# Patient Record
Sex: MALE | Race: WHITE | URBAN - METROPOLITAN AREA
[De-identification: names, ages, dates, MRNs, and addresses within clinical notes are randomized per-mention and may not be internally consistent; named-entity substitution may affect disease eponyms.]

---

## 2017-05-26 ENCOUNTER — OFFICE VISIT (OUTPATIENT)
Dept: FAMILY MEDICINE | Facility: CLINIC | Age: 57
End: 2017-05-26
Payer: COMMERCIAL

## 2017-05-26 VITALS
BODY MASS INDEX: 20.59 KG/M2 | HEART RATE: 62 BPM | WEIGHT: 139 LBS | TEMPERATURE: 97.7 F | SYSTOLIC BLOOD PRESSURE: 102 MMHG | DIASTOLIC BLOOD PRESSURE: 56 MMHG | HEIGHT: 69 IN

## 2017-05-26 DIAGNOSIS — Z12.11 SPECIAL SCREENING FOR MALIGNANT NEOPLASMS, COLON: ICD-10-CM

## 2017-05-26 DIAGNOSIS — Z12.5 SCREENING FOR PROSTATE CANCER: ICD-10-CM

## 2017-05-26 DIAGNOSIS — M54.42 CHRONIC BILATERAL LOW BACK PAIN WITH LEFT-SIDED SCIATICA: ICD-10-CM

## 2017-05-26 DIAGNOSIS — F41.1 GENERALIZED ANXIETY DISORDER: ICD-10-CM

## 2017-05-26 DIAGNOSIS — H04.123 DRY EYES: ICD-10-CM

## 2017-05-26 DIAGNOSIS — G89.29 CHRONIC BILATERAL LOW BACK PAIN WITH LEFT-SIDED SCIATICA: ICD-10-CM

## 2017-05-26 DIAGNOSIS — M72.0 DUPUYTREN'S CONTRACTURE OF BOTH HANDS: ICD-10-CM

## 2017-05-26 DIAGNOSIS — L27.2 DERMATITIS DUE TO FOOD TAKEN INTERNALLY: ICD-10-CM

## 2017-05-26 DIAGNOSIS — Z11.59 NEED FOR HEPATITIS C SCREENING TEST: ICD-10-CM

## 2017-05-26 DIAGNOSIS — M26.609 TMJ (TEMPOROMANDIBULAR JOINT SYNDROME): ICD-10-CM

## 2017-05-26 DIAGNOSIS — T78.1XXS ORAL ALLERGY SYNDROME, SEQUELA: ICD-10-CM

## 2017-05-26 DIAGNOSIS — R39.9 LOWER URINARY TRACT SYMPTOMS (LUTS): Primary | ICD-10-CM

## 2017-05-26 LAB
ALBUMIN SERPL-MCNC: 4.3 G/DL (ref 3.4–5)
ALP SERPL-CCNC: 42 U/L (ref 40–150)
ALT SERPL W P-5'-P-CCNC: 23 U/L (ref 0–70)
ANION GAP SERPL CALCULATED.3IONS-SCNC: 9 MMOL/L (ref 3–14)
AST SERPL W P-5'-P-CCNC: 22 U/L (ref 0–45)
BILIRUB SERPL-MCNC: 0.8 MG/DL (ref 0.2–1.3)
BUN SERPL-MCNC: 22 MG/DL (ref 7–30)
CALCIUM SERPL-MCNC: 9.2 MG/DL (ref 8.5–10.1)
CHLORIDE SERPL-SCNC: 102 MMOL/L (ref 94–109)
CO2 SERPL-SCNC: 25 MMOL/L (ref 20–32)
CREAT SERPL-MCNC: 0.93 MG/DL (ref 0.66–1.25)
CRP SERPL-MCNC: <2.9 MG/L (ref 0–8)
ERYTHROCYTE [DISTWIDTH] IN BLOOD BY AUTOMATED COUNT: 11.8 % (ref 10–15)
GFR SERPL CREATININE-BSD FRML MDRD: 85 ML/MIN/1.7M2
GLUCOSE SERPL-MCNC: 102 MG/DL (ref 70–99)
HCT VFR BLD AUTO: 39.8 % (ref 40–53)
HGB BLD-MCNC: 13.7 G/DL (ref 13.3–17.7)
MCH RBC QN AUTO: 31.2 PG (ref 26.5–33)
MCHC RBC AUTO-ENTMCNC: 34.4 G/DL (ref 31.5–36.5)
MCV RBC AUTO: 91 FL (ref 78–100)
PLATELET # BLD AUTO: 251 10E9/L (ref 150–450)
POTASSIUM SERPL-SCNC: 3.8 MMOL/L (ref 3.4–5.3)
PROT SERPL-MCNC: 7.6 G/DL (ref 6.8–8.8)
PSA SERPL-ACNC: 1.11 UG/L (ref 0–4)
RBC # BLD AUTO: 4.39 10E12/L (ref 4.4–5.9)
SODIUM SERPL-SCNC: 136 MMOL/L (ref 133–144)
TSH SERPL DL<=0.05 MIU/L-ACNC: 2.81 MU/L (ref 0.4–4)
WBC # BLD AUTO: 7 10E9/L (ref 4–11)

## 2017-05-26 PROCEDURE — 36415 COLL VENOUS BLD VENIPUNCTURE: CPT | Performed by: FAMILY MEDICINE

## 2017-05-26 PROCEDURE — 86140 C-REACTIVE PROTEIN: CPT | Performed by: FAMILY MEDICINE

## 2017-05-26 PROCEDURE — G0103 PSA SCREENING: HCPCS | Performed by: FAMILY MEDICINE

## 2017-05-26 PROCEDURE — 86235 NUCLEAR ANTIGEN ANTIBODY: CPT | Performed by: FAMILY MEDICINE

## 2017-05-26 PROCEDURE — 99205 OFFICE O/P NEW HI 60 MIN: CPT | Performed by: FAMILY MEDICINE

## 2017-05-26 PROCEDURE — 85027 COMPLETE CBC AUTOMATED: CPT | Performed by: FAMILY MEDICINE

## 2017-05-26 PROCEDURE — 84443 ASSAY THYROID STIM HORMONE: CPT | Performed by: FAMILY MEDICINE

## 2017-05-26 PROCEDURE — 80053 COMPREHEN METABOLIC PANEL: CPT | Performed by: FAMILY MEDICINE

## 2017-05-26 PROCEDURE — 86803 HEPATITIS C AB TEST: CPT | Performed by: FAMILY MEDICINE

## 2017-05-26 PROCEDURE — 86003 ALLG SPEC IGE CRUDE XTRC EA: CPT | Performed by: FAMILY MEDICINE

## 2017-05-26 RX ORDER — TAMSULOSIN HYDROCHLORIDE 0.4 MG/1
0.4 CAPSULE ORAL DAILY
Qty: 90 CAPSULE | Refills: 3 | Status: SHIPPED | OUTPATIENT
Start: 2017-05-26 | End: 2017-07-13

## 2017-05-26 RX ORDER — TAMSULOSIN HYDROCHLORIDE 0.4 MG/1
0.4 CAPSULE ORAL DAILY
Qty: 90 CAPSULE | Refills: 1 | Status: SHIPPED | OUTPATIENT
Start: 2017-05-26

## 2017-05-26 RX ORDER — LORAZEPAM 1 MG/1
0.5-1 TABLET ORAL EVERY 8 HOURS PRN
Qty: 4 TABLET | Refills: 0 | Status: SHIPPED | OUTPATIENT
Start: 2017-05-26

## 2017-05-26 ASSESSMENT — ANXIETY QUESTIONNAIRES
3. WORRYING TOO MUCH ABOUT DIFFERENT THINGS: NEARLY EVERY DAY
GAD7 TOTAL SCORE: 15
6. BECOMING EASILY ANNOYED OR IRRITABLE: SEVERAL DAYS
7. FEELING AFRAID AS IF SOMETHING AWFUL MIGHT HAPPEN: NEARLY EVERY DAY
1. FEELING NERVOUS, ANXIOUS, OR ON EDGE: NEARLY EVERY DAY
IF YOU CHECKED OFF ANY PROBLEMS ON THIS QUESTIONNAIRE, HOW DIFFICULT HAVE THESE PROBLEMS MADE IT FOR YOU TO DO YOUR WORK, TAKE CARE OF THINGS AT HOME, OR GET ALONG WITH OTHER PEOPLE: VERY DIFFICULT
5. BEING SO RESTLESS THAT IT IS HARD TO SIT STILL: SEVERAL DAYS
2. NOT BEING ABLE TO STOP OR CONTROL WORRYING: NEARLY EVERY DAY

## 2017-05-26 ASSESSMENT — PATIENT HEALTH QUESTIONNAIRE - PHQ9: 5. POOR APPETITE OR OVEREATING: SEVERAL DAYS

## 2017-05-26 NOTE — PATIENT INSTRUCTIONS
"  Labs today.    The physical therapy department will be contacting you to arrange therapy. They are very.     Ok to use lorazepam (Ativan) prior to blood draws or appointments.     Make appointment with the eye doctor at [facility]. They are very nice.     Flomax trial.     If question about allergies, we will have you see Dr LOPEZ, our allergist.    We can fix your hand contractures when/if you want.    Stress Management:  Carry a list of options to deal with stress all of the time.  Exercise, especially things that are fun and engaging (games, sports, group activities, etc.)  Engage in hobbies.  Consider something new that you've always wanted to do.  Call friends when stressed.  Have a list of people that you can call.   Puzzles, games or other activities that you can pull out for distration.  Progressive relaxation tapes or CD's (Tak[employer]ak).  Well rounded diet, avoiding simple carbohydrates ans sticking with proteins like nuts.  Try to keep a consistent sleep-wake cycle.     Talk with family and friends about stress.  Allowing people to care for you is sometimes the greatest gift you can give them.      In order to best meet your particular needs for a variety of situations, we offer phone visits and e-visits in addition to the traditional office visits.    The e-visit is often convenient, allowing you to contact your provider when it best suits your needs.  If you'd like to schedule an e-visit, simply log into your [employer] account and select \"E-visit\".      Phone visits are good if we need to ask and answer questions of one another or in preparation for an office visit.  If we do follow-up with an office visit, you will not be charged for a phone visit.  To arrange a phone visit, simply respond electronically or contact our schedulers.     If you'd like to schedule an office visit you can do this by calling our main number or schedule electronically through [employer].     Finally, we offer immediate, web-based " services with on-call providers 24/7 through [facility] which can be selected on the [place] home page.

## 2017-05-26 NOTE — NURSING NOTE
"Chief Complaint   Patient presents with     Urinary Problem       Initial /56  Pulse 62  Temp 97.7  F (36.5  C) (Tympanic)  Ht 5' 8.82\" (1.748 m)  Wt 139 lb (63 kg)  BMI 20.63 kg/m2 Estimated body mass index is 20.63 kg/(m^2) as calculated from the following:    Height as of this encounter: 5' 8.82\" (1.748 m).    Weight as of this encounter: 139 lb (63 kg).  Medication Reconciliation: complete     Dasha Farah CMA      "

## 2017-05-26 NOTE — PROGRESS NOTES
SUBJECTIVE:                                                    Sam Piper is a 56 year old male who presents to clinic today for the following health issues:      Pt presents today in clinic with a list of problems to go over with you.     Anxiety - a lot about medical issues.      PHQ-9 SCORE 5/26/2017   Total Score 6     BRINA-7 SCORE 5/26/2017   Total Score 15       Please see flow-sheet for specific details.     LOWER URINARY TRACT SYMPTOMS - weak stream, hesitant, difficulty in public places.  Never saw urology. Less of a problem at home but still some issues. Used saw palmetto but sensitivity issues. Flomax given but never took.     Eyes - weak, blurry, dry eyes. Had episode of anxiety with eye doctor. Fainted and threw up. Left without being seen. Uses natural tears.  Help some.  Dry mouth a lot.     Cold Feet - sweat and cold a lot.  Some pain.  Wears long johns to work. Calluses on feet. Has checked blood sugars and all normal.     Allergies - lifelong.  Will have reactions to foods and hazelnuts.  Carrots causes itchy throat but cooked carrots are ok.     Aches - hips bother him.  Wears an SI belt.  Went to chiropractor in past.  Told that limb length discrepancy. Has had a lot dip swelling and thinks that cherry.     TMJ - wears mouthguard and helps. Chronic.     Hand contractures... - getting worse. Bothersome.     H//O intracranial bleed. 2002 [place]. Angiogram normal.     Problem list and histories reviewed & adjusted, as indicated.  Additional history: as documented        Reviewed and updated as needed this visit by clinical staff  Tobacco  Allergies  Meds  Problems  Med Hx  Surg Hx  Fam Hx  Soc Hx        Reviewed and updated as needed this visit by Provider  Tobacco  Meds  Problems  Soc Hx        1. Lower urinary tract symptoms (LUTS)    2. Generalized anxiety disorder    3. Screening for prostate cancer    4. Dermatitis due to food taken internally    5. Oral allergy syndrome, sequela   "  6. Chronic bilateral low back pain with left-sided sciatica    7. TMJ (temporomandibular joint syndrome)    8. Dupuytren's contracture of both hands    9. Dry eyes    10. Need for hepatitis C screening test    11. Special screening for malignant neoplasms, colon        PMH: Updated and/or reviewed in chart.    PSH: Updated and/or reviewed in chart.    Family History: Updated and/or reviewed in chart.     ROS:  Normal bowels. Constitutional, neuro, EMT, endocrine, pulmonary, cardiac, gastrointestinal, genitourinary, musculoskeletal, integument and psychiatric systems are otherwise negative.    OBJECTIVE:                                                    /56  Pulse 62  Temp 97.7  F (36.5  C) (Tympanic)  Ht 5' 8.82\" (1.748 m)  Wt 139 lb (63 kg)  BMI 20.63 kg/m2  GENERAL: Pleasant and interactive.  Alert and oriented x 3.  No acute distress.  PSYCH: Alert. Cooperative.  Mildly agitated.  Mood is anxious and affect is consistant.  Denies suicidal or homicidal ideation.  HEENT: Normocephalic, atraumatic. PEERRLA, EOMI.  Scleras, lids and conjunctivae normal but the tear film is reduced. Pinnas, canals and TM's clear.  Nose and oropharynx moist and clear.   NECK: supple and free of adenopathy or masses, the thyroid is normal without enlargement or nodules.  CHEST:  clear, no wheezing or rales. Normal symmetric air entry throughout both lung fields. No chest wall deformities or tenderness.  HEART:  S1 and S2 normal, no murmurs, clicks, gallops or rubs. Regular rate and rhythm.  RECTUM: normal without masses. Normal prostate.  SKIN:  Only benign skin findings. No unusual rashes or suspicious skin lesions noted. Nails appear normal.       Results for orders placed or performed in visit on 03/23/12   TSH   Result Value Ref Range    TSH 2.97 0.4 - 5.0 mU/L   Lipid panel reflex to direct LDL   Result Value Ref Range    Cholesterol 176 0 - 200 mg/dL    Triglycerides 68 0 - 150 mg/dL    HDL Cholesterol 54 40 - 110 " mg/dL    LDL Cholesterol Calculated 108 0 - 129 mg/dL    VLDL-Cholesterol 14 0 - 30 mg/dL    Cholesterol/HDL Ratio 3.2 0.0 - 5.0   Vitamin D Deficiency   Result Value Ref Range    25 OH Vit D2 <5 ug/L    25 OH Vit D3 31 ug/L    25 OH Vit D total  30 - 75 ug/L     <36  Season, race, dietary intake, and treatment affect the concentration of   25-hydroxy-Vitamin D. Values may decrease during winter months and increase   during summer months. Values less than 30 ug/L may indicate Vitamin D   deficiency.      ASSESSMENT/PLAN:                                                        ICD-10-CM    1. Lower urinary tract symptoms (LUTS) R39.9 Prostate spec antigen screen     tamsulosin (FLOMAX) 0.4 MG capsule     tamsulosin (FLOMAX) 0.4 MG capsule   2. Generalized anxiety disorder F41.1 TSH     CRP inflammation     CBC with platelets     LORazepam (ATIVAN) 1 MG tablet   3. Screening for prostate cancer Z12.5 Prostate spec antigen screen     tamsulosin (FLOMAX) 0.4 MG capsule   4. Dermatitis due to food taken internally L27.2 Allergy adult food panel   5. Oral allergy syndrome, sequela T78.1XXS CBC with platelets     Allergy adult food panel   6. Chronic bilateral low back pain with left-sided sciatica M54.42 [facility] PT, HAND, AND CHIROPRACTIC REFERRAL    G89.29    7. TMJ (temporomandibular joint syndrome) M26.609    8. Dupuytren's contracture of both hands M72.0 CRP inflammation   9. Dry eyes H04.123 Comprehensive metabolic panel     TSH     CRP inflammation     SSA Ro REMI Antibody IgG     SSB La REMI Antibody IgG     OPHTHALMOLOGY ADULT REFERRAL   10. Need for hepatitis C screening test Z11.59 Hepatitis C Screen Reflex to HCV RNA Quant and Genotype   11. Special screening for malignant neoplasms, colon Z12.11 Fecal colorectal cancer screen FIT       Care plan updated in chart for chronic problems.    Patient Instructions     Labs today.    The physical therapy department will be contacting you to arrange therapy. They are very.  "    Ok to use lorazepam (Ativan) prior to blood draws or appointments.     Make appointment with the eye doctor at [facility]. They are very nice.     Flomax trial.     If question about allergies, we will have you see Dr LOPEZ, our allergist.    We can fix your hand contractures when/if you want.    Stress Management:  Carry a list of options to deal with stress all of the time.  Exercise, especially things that are fun and engaging (games, sports, group activities, etc.)  Engage in hobbies.  Consider something new that you've always wanted to do.  Call friends when stressed.  Have a list of people that you can call.   Puzzles, games or other activities that you can pull out for distration.  Progressive relaxation tapes or CD's ([employer]).  Well rounded diet, avoiding simple carbohydrates ans sticking with proteins like nuts.  Try to keep a consistent sleep-wake cycle.     Talk with family and friends about stress.  Allowing people to care for you is sometimes the greatest gift you can give them.      In order to best meet your particular needs for a variety of situations, we offer phone visits and e-visits in addition to the traditional office visits.    The e-visit is often convenient, allowing you to contact your provider when it best suits your needs.  If you'd like to schedule an e-visit, simply log into your [employer] account and select \"E-visit\".      Phone visits are good if we need to ask and answer questions of one another or in preparation for an office visit.  If we do follow-up with an office visit, you will not be charged for a phone visit.  To arrange a phone visit, simply respond electronically or contact our schedulers.     If you'd like to schedule an office visit you can do this by calling our main number or schedule electronically through [employer].     Finally, we offer immediate, web-based services with on-call providers 24/7 through [facility] which can be selected on the [employer] home page.         " "  No orders of the defined types were placed in this encounter.     BMI:   Estimated body mass index is 20.63 kg/(m^2) as calculated from the following:    Height as of this encounter: 5' 8.82\" (1.748 m).    Weight as of this encounter: 139 lb (63 kg).     30 minutes of the 55 minute appointment was spent with counseling and education and/or coordinating care with regards to above problem(s). See patients instructions for futher details.       See Patient Instructions    Talon Hutchison MD        "

## 2017-05-26 NOTE — MR AVS SNAPSHOT
After Visit Summary   5/26/2017    Sam Piper    MRN: [id number]           Patient Information     Date Of Birth          1960        Visit Information        Provider Department      5/26/2017 10:30 AM Talon Hutchison MD [facility]        Today's Diagnoses     Lower urinary tract symptoms (LUTS)    -  1    Generalized anxiety disorder        Screening for prostate cancer        Dermatitis due to food taken internally        Oral allergy syndrome, sequela        Chronic bilateral low back pain with left-sided sciatica        TMJ (temporomandibular joint syndrome)        Dupuytren's contracture of both hands        Dry eyes        Need for hepatitis C screening test        Special screening for malignant neoplasms, colon          Care Instructions      Labs today.    The physical therapy department will be contacting you to arrange therapy. They are very.     Ok to use lorazepam (Ativan) prior to blood draws or appointments.     Make appointment with the eye doctor at [facility]. They are very nice.     Flomax trial.     If question about allergies, we will have you see Dr LOPEZ, our allergist.    We can fix your hand contractures when/if you want.    Stress Management:  Carry a list of options to deal with stress all of the time.  Exercise, especially things that are fun and engaging (games, sports, group activities, etc.)  Engage in hobbies.  Consider something new that you've always wanted to do.  Call friends when stressed.  Have a list of people that you can call.   Puzzles, games or other activities that you can pull out for distration.  Progressive relaxation tapes or CD's (Cloudkick).  Well rounded diet, avoiding simple carbohydrates ans sticking with proteins like nuts.  Try to keep a consistent sleep-wake cycle.     Talk with family and friends about stress.  Allowing people to care for you is sometimes the greatest gift you can give them.      In order to best meet  "your particular needs for a variety of situations, we offer phone visits and e-visits in addition to the traditional office visits.    The e-visit is often convenient, allowing you to contact your provider when it best suits your needs.  If you'd like to schedule an e-visit, simply log into your [employer] account and select \"E-visit\".      Phone visits are good if we need to ask and answer questions of one another or in preparation for an office visit.  If we do follow-up with an office visit, you will not be charged for a phone visit.  To arrange a phone visit, simply respond electronically or contact our schedulers.     If you'd like to schedule an office visit you can do this by calling our main number or schedule electronically through [employer].     Finally, we offer immediate, web-based services with on-call providers 24/7 through [employer] which can be selected on the [employer] home page.                Follow-ups after your visit        Additional Services     [facility] PT, HAND, AND CHIROPRACTIC REFERRAL       **This order will print in the [facility] Scheduling Office**    Physical Therapy, Hand Therapy and Chiropractic Care are available through:    *[facility]  *[facility]  *[facility]    Call one number to schedule at any of the above locations: [phone].    Your provider has referred you to: Physical Therapy at [facility] or [facility]    Indication/Reason for Referral: Low Back Pain  Onset of Illness: chronic with some SI pain and gluteal issues.   Therapy Orders: Evaluate and Treat  Special Programs: None  Special Request: None    Israel Wood      Additional Comments for the Therapist or Chiropractor:     Please be aware that coverage of these services is subject to the terms and limitations of your health insurance plan.  Call member services at your health plan with any benefit or coverage questions.      Please bring the following to your appointment:    *Your " personal calendar for scheduling future appointments  *Comfortable clothing            OPHTHALMOLOGY ADULT REFERRAL       Your provider has referred you to: [facility]: [facility] [place] [phone]   [email or web address]    Please be aware that coverage of these services is subject to the terms and limitations of your health insurance plan.  Call member services at your health plan with any benefit or coverage questions.      Please bring the following with you to your appointment:    (1) Any X-Rays, CTs or MRIs which have been performed.  Contact the facility where they were done to arrange for  prior to your scheduled appointment.    (2) List of current medications  (3) This referral request   (4) Any documents/labs given to you for this referral                  Future tests that were ordered for you today     Open Future Orders        Priority Expected Expires Ordered    Fecal colorectal cancer screen FIT Routine 6/16/2017 8/18/2017 5/26/2017            Who to contact     Normal or non-critical lab and imaging results will be communicated to you by [employer], letter or phone within 4 business days after the clinic has received the results. If you do not hear from us within 7 days, please contact the clinic through [employer] or phone. If you have a critical or abnormal lab result, we will notify you by phone as soon as possible.  Submit refill requests through [employer] or call your pharmacy and they will forward the refill request to us. Please allow 3 business days for your refill to be completed.          If you need to speak with a  for additional information , please call: [phone]           Additional Information About Your Visit        [employer] Information     [employer] gives you secure access to your electronic health record. If you see a primary care provider, you can also send messages to your care team and make appointments. If you have questions, please call your  "primary care clinic.  If you do not have a primary care provider, please call [phone] and they will assist you.        Care EveryWhere ID     This is your Care EveryWhere ID. This could be used by other organizations to access your [facility] medical records  [id number]        Your Vitals Were     Pulse Temperature Height BMI (Body Mass Index)          62 97.7  F (36.5  C) (Tympanic) 5' 8.82\" (1.748 m) 20.63 kg/m2         Blood Pressure from Last 3 Encounters:   05/26/17 102/56   03/23/12 127/80    Weight from Last 3 Encounters:   05/26/17 139 lb (63 kg)   03/23/12 140 lb (63.5 kg)              We Performed the Following     Allergy adult food panel     CBC with platelets     Comprehensive metabolic panel     CRP inflammation     Hepatitis C Screen Reflex to HCV RNA Quant and Genotype     [facility] PT, HAND, AND CHIROPRACTIC REFERRAL     OPHTHALMOLOGY ADULT REFERRAL     Prostate spec antigen screen     SSA Ro REMI Antibody IgG     SSB La REMI Antibody IgG     TSH          Today's Medication Changes          These changes are accurate as of: 5/26/17 11:49 AM.  If you have any questions, ask your nurse or doctor.               Start taking these medicines.        Dose/Directions    LORazepam 1 MG tablet   Commonly known as:  ATIVAN   Used for:  Generalized anxiety disorder   Started by:  Talon Hutchison MD        Dose:  0.5-1 mg   Take 0.5-1 tablets (0.5-1 mg) by mouth every 8 hours as needed for anxiety Take 30 minutes prior to departure.  Do not operate a vehicle after taking this medication   Quantity:  4 tablet   Refills:  0       * tamsulosin 0.4 MG capsule   Commonly known as:  FLOMAX   Used for:  Lower urinary tract symptoms (LUTS)   Started by:  Talon Hutchison MD        Dose:  0.4 mg   Take 1 capsule (0.4 mg) by mouth daily   Quantity:  90 capsule   Refills:  3       * tamsulosin 0.4 MG capsule   Commonly known as:  FLOMAX   Used for:  Lower urinary tract symptoms (LUTS), Screening for prostate cancer "   Started by:  Talon Hutchison MD        Dose:  0.4 mg   Take 1 capsule (0.4 mg) by mouth daily   Quantity:  90 capsule   Refills:  1       * Notice:  This list has 2 medication(s) that are the same as other medications prescribed for you. Read the directions carefully, and ask your doctor or other care provider to review them with you.         Where to get your medicines      These medications were sent to [facility] - [address]  [address] [facility] [address] [place] [address]     Phone:  [phone]     tamsulosin 0.4 MG capsule    tamsulosin 0.4 MG capsule         Some of these will need a paper prescription and others can be bought over the counter.  Ask your nurse if you have questions.     Bring a paper prescription for each of these medications     LORazepam 1 MG tablet                Primary Care Provider Office Phone # Fax #    Tal Mcmahan [phone]       [facility] [address] [address] [address]        Thank you!     Thank you for choosing [facility]  for your care. Our goal is always to provide you with excellent care. Hearing back from our patients is one way we can continue to improve our services. Please take a few minutes to complete the written survey that you may receive in the mail after your visit with us. Thank you!             Your Updated Medication List - Protect others around you: Learn how to safely use, store and throw away your medicines at www.disposemymeds.org.          This list is accurate as of: 5/26/17 11:49 AM.  Always use your most recent med list.                   Brand Name Dispense Instructions for use    LORazepam 1 MG tablet    ATIVAN    4 tablet    Take 0.5-1 tablets (0.5-1 mg) by mouth every 8 hours as needed for anxiety Take 30 minutes prior to departure.  Do not operate a vehicle after taking this medication       NO ACTIVE MEDICATIONS          * tamsulosin 0.4 MG capsule     FLOMAX    90 capsule    Take 1 capsule (0.4 mg) by mouth daily       * tamsulosin 0.4 MG capsule    FLOMAX    90 capsule    Take 1 capsule (0.4 mg) by mouth daily       * Notice:  This list has 2 medication(s) that are the same as other medications prescribed for you. Read the directions carefully, and ask your doctor or other care provider to review them with you.

## 2017-05-27 ASSESSMENT — PATIENT HEALTH QUESTIONNAIRE - PHQ9: SUM OF ALL RESPONSES TO PHQ QUESTIONS 1-9: 6

## 2017-05-27 ASSESSMENT — ANXIETY QUESTIONNAIRES: GAD7 TOTAL SCORE: 15

## 2017-05-28 LAB — HCV AB SERPL QL IA: NORMAL

## 2017-05-30 LAB
ENA SS-A IGG SER IA-ACNC: NORMAL AI (ref 0–0.9)
ENA SS-B IGG SER IA-ACNC: NORMAL AI (ref 0–0.9)

## 2017-05-31 LAB
CLAM IGE QN: ABNORMAL KU(A)/L
CODFISH IGE QN: ABNORMAL KU(A)/L
CORN IGE QN: ABNORMAL KU(A)/L
COW MILK IGE QN: ABNORMAL KU/L
EGG WHITE IGE QN: ABNORMAL KU(A)/L
PEANUT IGE QN: 0.12 KU(A)/L
SCALLOP IGE QN: ABNORMAL KU(A)/L
SHRIMP IGE QN: 0.22 KU(A)/L
SOYBEAN IGE QN: ABNORMAL KU(A)/L
WALNUT IGE QN: ABNORMAL KU(A)/L
WHEAT IGE QN: ABNORMAL KU(A)/L

## 2017-06-06 NOTE — PROGRESS NOTES
MrArmen Piper,    Your Sjogren's tests were negative.     Please contact the clinic if you have additional questions.  Thank you.    Sincerely,    Fernando Hutchison MD

## 2017-06-08 ENCOUNTER — THERAPY VISIT (OUTPATIENT)
Dept: PHYSICAL THERAPY | Facility: CLINIC | Age: 57
End: 2017-06-08
Payer: COMMERCIAL

## 2017-06-08 DIAGNOSIS — M54.50 CHRONIC BILATERAL LOW BACK PAIN WITHOUT SCIATICA: Primary | ICD-10-CM

## 2017-06-08 DIAGNOSIS — G89.29 CHRONIC BILATERAL LOW BACK PAIN WITHOUT SCIATICA: Primary | ICD-10-CM

## 2017-06-08 PROCEDURE — 97140 MANUAL THERAPY 1/> REGIONS: CPT | Mod: GP | Performed by: PHYSICAL THERAPIST

## 2017-06-08 PROCEDURE — 97161 PT EVAL LOW COMPLEX 20 MIN: CPT | Mod: GP | Performed by: PHYSICAL THERAPIST

## 2017-06-08 NOTE — PROGRESS NOTES
[facility] Initial Evaluation    Subjective:    Patient is a 56 year old male presenting with rehab back hpi.   Sam Piper is a 56 year old male with a lumbar condition.  Condition occurred with:  Insidious onset.  Condition occurred: for unknown reasons.  This is a chronic condition  15 years of LBP.  Pt states he was told his hip is misaligned and that is what caused it.  Has been doing stretches for years and they have helped.  But now he feels he is walking or standing wrong because it's not working as well anymore.  Pt was referred to PT on 5/26/17.    Patient reports pain:  Lumbar spine right and lumbar spine left.  Radiates to:  Lower leg right and lower leg left.  Pain is described as aching and burning and is intermittent and reported as 6/10.  Associated symptoms:  Loss of motion/stiffness. Pain is worse in the P.M..  Symptoms are exacerbated by standing and relieved by rest and bracing/immobilizing.  Since onset symptoms are gradually worsening.  Special testing: no recent imaging.      General health as reported by patient is good.                                              Objective:          Flexibility/Screens:       Lower Extremity:  Decreased left lower extremity flexibility:Hip ER's    Decreased right lower extremity flexibility:  Hip ER's and Piriformis               Lumbar/SI Evaluation  ROM:    AROM Lumbar:   Flexion:          WNL (-)  Ext:                    Moderate loss (+)   Side Bend:        Left:     Right:   Rotation:           Left:     Right:   Side Glide:        Left:  WNL (-)    Right:  WNL (-)        Strength: Glut max: R: 4+/5, L: 4/5  Lumbar Myotomes:  normal                Lumbar Dermtomes:  normal                Neural Tension/Mobility:      Left side:SLR  negative.     Right side:   SLR  negative.   Lumbar Palpation:      Tenderness not present at Left:    Quadratus Lumborum; Erector Spinae or Piriformis  Tenderness present at Right: Piriformis  (hypertonic)  Tenderness not present at Right:  Quadratus Lumborum or Erector Spinae    Lumbar Provocation:      Left negative with:  PROM hip  Right positive with: PROM hip (R IR limited more than L)  Spinal Segmental Conclusions:     Level: Hypo noted at L3, L4 and L5                                                   General     ROS    Assessment/Plan:      Patient is a 56 year old male with lumbar complaints.    Patient has the following significant findings with corresponding treatment plan.                Diagnosis 1:  LBP  Pain -  manual therapy, self management, education and home program  Decreased ROM/flexibility - manual therapy, therapeutic exercise and home program  Decreased joint mobility - manual therapy, therapeutic exercise and home program  Decreased strength - therapeutic exercise, therapeutic activities and home program    Therapy Evaluation Codes:   1) History comprised of:   Personal factors that impact the plan of care:      Coping style and Time since onset of symptoms.    Comorbidity factors that impact the plan of care are:      None.     Medications impacting care: None.  2) Examination of Body Systems comprised of:   Body structures and functions that impact the plan of care:      Hip and Lumbar spine.   Activity limitations that impact the plan of care are:      Standing.  3) Clinical presentation characteristics are:   Stable/Uncomplicated.  4) Decision-Making    Low complexity using standardized patient assessment instrument and/or measureable assessment of functional outcome.  Cumulative Therapy Evaluation is: Low complexity.    Previous and current functional limitations:  (See Goal Flow Sheet for this information)    Short term and Long term goals: (See Goal Flow Sheet for this information)     Communication ability:  Patient appears to be able to clearly communicate and understand verbal and written communication and follow directions correctly.  Treatment Explanation - The  following has been discussed with the patient:   RX ordered/plan of care  Anticipated outcomes  Possible risks and side effects  This patient would benefit from PT intervention to resume normal activities.   Rehab potential is good.    Frequency:  1 X week, once daily  Duration:  for 4 weeks  Discharge Plan:  Achieve all LTG.  Independent in home treatment program.  Reach maximal therapeutic benefit.    Please refer to the daily flowsheet for treatment today, total treatment time and time spent performing 1:1 timed codes.

## 2017-06-08 NOTE — MR AVS SNAPSHOT
After Visit Summary   6/8/2017    Sam Piper    MRN: [id number]           Patient Information     Date Of Birth          1960        Visit Information        Provider Department      6/8/2017 2:30 PM Rajeev Salamanca PT [facility] [place] [facility]        Today's Diagnoses     Chronic bilateral low back pain without sciatica    -  1       Follow-ups after your visit        Your next 10 appointments already scheduled     Jun 16, 2017 12:50 PM CDT   LORI Spine with Rajeev Salamanca PT   [facility] [place] [facility] ([facility])    [address]  [place] [address]   [phone]            Jun 20, 2017  1:30 PM CDT   LORI Spine with Rajeev Salamanca PT   [facility] [place] [facility] ([facility])    [address]  [place] [address]   [phone]            Jun 27, 2017  1:30 PM CDT   LORI Spine with Rajeev Salamanca PT   [facility] [place] [facility] ([facility])    [address]  [place] [address]   [phone]              Who to contact     If you have questions or need follow up information about today's clinic visit or your schedule please contact [facility] directly at [phone].  Normal or non-critical lab and imaging results will be communicated to you by MyChart, letter or phone within 4 business days after the clinic has received the results. If you do not hear from us within 7 days, please contact the clinic through HEMS Technologyhart or phone. If you have a critical or abnormal lab result, we will notify you by phone as soon as possible.  Submit refill requests through Atlassian or call your pharmacy and they will forward the refill request to us. Please allow 3 business days for your refill to be completed.          Additional Information About Your Visit        MyChart Information     Atlassian gives you  secure access to your electronic health record. If you see a primary care provider, you can also send messages to your care team and make appointments. If you have questions, please call your primary care clinic.  If you do not have a primary care provider, please call [phone] and they will assist you.        Care EveryWhere ID     This is your Care EveryWhere ID. This could be used by other organizations to access your [place] medical records  [id number]         Blood Pressure from Last 3 Encounters:   05/26/17 102/56   03/23/12 127/80    Weight from Last 3 Encounters:   05/26/17 63 kg (139 lb)   03/23/12 63.5 kg (140 lb)              We Performed the Following     HC PT EVAL, LOW COMPLEXITY     MANUAL THER TECH,1+REGIONS,EA 15 MIN        Primary Care Provider Office Phone # Fax #    Tal Mcmahan [phone]       [facility] [address] [address]        Thank you!     Thank you for choosing [facility]  for your care. Our goal is always to provide you with excellent care. Hearing back from our patients is one way we can continue to improve our services. Please take a few minutes to complete the written survey that you may receive in the mail after your visit with us. Thank you!             Your Updated Medication List - Protect others around you: Learn how to safely use, store and throw away your medicines at www.disposemymeds.org.          This list is accurate as of: 6/8/17  4:00 PM.  Always use your most recent med list.                   Brand Name Dispense Instructions for use    LORazepam 1 MG tablet    ATIVAN    4 tablet    Take 0.5-1 tablets (0.5-1 mg) by mouth every 8 hours as needed for anxiety Take 30 minutes prior to departure.  Do not operate a vehicle after taking this medication       NO ACTIVE MEDICATIONS          * tamsulosin 0.4 MG capsule    FLOMAX    90 capsule    Take 1 capsule (0.4 mg) by mouth daily        * tamsulosin 0.4 MG capsule    FLOMAX    90 capsule    Take 1 capsule (0.4 mg) by mouth daily       * Notice:  This list has 2 medication(s) that are the same as other medications prescribed for you. Read the directions carefully, and ask your doctor or other care provider to review them with you.

## 2017-06-16 ENCOUNTER — THERAPY VISIT (OUTPATIENT)
Dept: PHYSICAL THERAPY | Facility: CLINIC | Age: 57
End: 2017-06-16
Payer: COMMERCIAL

## 2017-06-16 DIAGNOSIS — M54.50 CHRONIC BILATERAL LOW BACK PAIN WITHOUT SCIATICA: ICD-10-CM

## 2017-06-16 DIAGNOSIS — G89.29 CHRONIC BILATERAL LOW BACK PAIN WITHOUT SCIATICA: ICD-10-CM

## 2017-06-16 PROCEDURE — 97140 MANUAL THERAPY 1/> REGIONS: CPT | Mod: GP | Performed by: PHYSICAL THERAPIST

## 2017-06-16 PROCEDURE — 97110 THERAPEUTIC EXERCISES: CPT | Mod: GP | Performed by: PHYSICAL THERAPIST

## 2017-06-16 NOTE — MR AVS SNAPSHOT
After Visit Summary   6/16/2017    Sam Piper    MRN: [id number]           Patient Information     Date Of Birth          1960        Visit Information        Provider Department      6/16/2017 12:50 PM Rajeev Salamanca PT [facility] [place] [facility]        Today's Diagnoses     Chronic bilateral low back pain without sciatica           Follow-ups after your visit        Your next 10 appointments already scheduled     Jun 27, 2017  1:30 PM CDT   LORI Spine with Rajeev Salamanca PT   [facility] [place] [facility] ([facility])    [address]  [place] [address]   [phone]            Jul 11, 2017  1:30 PM CDT   LORI Spine with Rajeev Salamanca PT   [facility] [place] [facility] ([facility])    [address]  [place] [address]   [phone]              Who to contact     If you have questions or need follow up information about today's clinic visit or your schedule please contact [facility] directly at [phone].  Normal or non-critical lab and imaging results will be communicated to you by SECU4hart, letter or phone within 4 business days after the clinic has received the results. If you do not hear from us within 7 days, please contact the clinic through SECU4hart or phone. If you have a critical or abnormal lab result, we will notify you by phone as soon as possible.  Submit refill requests through [email or web address] or call your pharmacy and they will forward the refill request to us. Please allow 3 business days for your refill to be completed.          Additional Information About Your Visit        MyChart Information     [email or web address] gives you secure access to your electronic health record. If you see a primary care provider, you can also send messages to your care team and make appointments. If you have questions, please call your primary care clinic.  If  you do not have a primary care provider, please call [phone] and they will assist you.        Care EveryWhere ID     This is your Care EveryWhere ID. This could be used by other organizations to access your [place] medical records  [id number]         Blood Pressure from Last 3 Encounters:   05/26/17 102/56   03/23/12 127/80    Weight from Last 3 Encounters:   05/26/17 63 kg (139 lb)   03/23/12 63.5 kg (140 lb)              We Performed the Following     MANUAL THER TECH,1+REGIONS,EA 15 MIN     THERAPEUTIC EXERCISES        Primary Care Provider Office Phone # Fax #    Tal Mcmahan [phone]       [facility] [address] [address] [address]        Thank you!     Thank you for choosing [facility]  for your care. Our goal is always to provide you with excellent care. Hearing back from our patients is one way we can continue to improve our services. Please take a few minutes to complete the written survey that you may receive in the mail after your visit with us. Thank you!             Your Updated Medication List - Protect others around you: Learn how to safely use, store and throw away your medicines at www.disposemymeds.org.          This list is accurate as of: 6/16/17  5:29 PM.  Always use your most recent med list.                   Brand Name Dispense Instructions for use    LORazepam 1 MG tablet    ATIVAN    4 tablet    Take 0.5-1 tablets (0.5-1 mg) by mouth every 8 hours as needed for anxiety Take 30 minutes prior to departure.  Do not operate a vehicle after taking this medication       NO ACTIVE MEDICATIONS          * tamsulosin 0.4 MG capsule    FLOMAX    90 capsule    Take 1 capsule (0.4 mg) by mouth daily       * tamsulosin 0.4 MG capsule    FLOMAX    90 capsule    Take 1 capsule (0.4 mg) by mouth daily       * Notice:  This list has 2 medication(s) that are the same as other medications prescribed for you. Read the directions  carefully, and ask your doctor or other care provider to review them with you.

## 2017-06-16 NOTE — PROGRESS NOTES
Subjective:    HPI                    Objective:    System    Physical Exam    General     ROS    Assessment/Plan:      SUBJECTIVE  Subjective: A little better but nothing significant.  Having some trouble getting his R glut to fire well with exercises.  Strained his chest doing the press ups and now has pain with breathing and soreness in pec   Current Pain level: 5/10   Changes in function:  None     Adverse reaction to treatment or activity:  None    OBJECTIVE  Objective: Hypomobile and painful springing of L rib 2 and adjacent trigger points in pec major.  Following manual therapy pt has no pain with deep breaths.  Initially fatigued with a few reps of bent knee lifts but improved with cuing- verbal and manual     ASSESSMENT  Sam continues to require intervention to meet STG and LTG's: PT  Patient is progressing as expected.  Response to therapy has shown an improvement in  pain level and muscle control  Progress made towards STG/LTG?  None    PLAN  Current treatment program is being advanced to more complex exercises.    PTA/ATC plan:  N/A    Please refer to the daily flowsheet for treatment today, total treatment time and time spent performing 1:1 timed codes.

## 2017-06-27 ENCOUNTER — THERAPY VISIT (OUTPATIENT)
Dept: PHYSICAL THERAPY | Facility: CLINIC | Age: 57
End: 2017-06-27
Payer: COMMERCIAL

## 2017-06-27 DIAGNOSIS — M54.50 CHRONIC BILATERAL LOW BACK PAIN WITHOUT SCIATICA: ICD-10-CM

## 2017-06-27 DIAGNOSIS — G89.29 CHRONIC BILATERAL LOW BACK PAIN WITHOUT SCIATICA: ICD-10-CM

## 2017-06-27 PROCEDURE — 97110 THERAPEUTIC EXERCISES: CPT | Mod: GP | Performed by: PHYSICAL THERAPIST

## 2017-06-27 PROCEDURE — 97140 MANUAL THERAPY 1/> REGIONS: CPT | Mod: GP | Performed by: PHYSICAL THERAPIST

## 2017-06-27 NOTE — MR AVS SNAPSHOT
After Visit Summary   6/27/2017    Sam Piper    MRN: [id number]           Patient Information     Date Of Birth          1960        Visit Information        Provider Department      6/27/2017 1:30 PM Rajeev Salamanca PT [facility] [place] [facility]        Today's Diagnoses     Chronic bilateral low back pain without sciatica           Follow-ups after your visit        Your next 10 appointments already scheduled     Jul 11, 2017  1:30 PM CDT   LORI Spine with Rajeev Salamanca PT   [facility] [place] [facility] ([facility])    [address]  [place] [address] [phone]              Who to contact     If you have questions or need follow up information about today's clinic visit or your schedule please contact [facility] directly at [phone].  Normal or non-critical lab and imaging results will be communicated to you by Swapdomhart, letter or phone within 4 business days after the clinic has received the results. If you do not hear from us within 7 days, please contact the clinic through Swapdomhart or phone. If you have a critical or abnormal lab result, we will notify you by phone as soon as possible.  Submit refill requests through "Sp[employer]ety [employer] or call your pharmacy and they will forward the refill request to us. Please allow 3 business days for your refill to be completed.          Additional Information About Your Visit        MyChart Information     "[employer] gives you secure access to your electronic health record. If you see a primary care provider, you can also send messages to your care team and make appointments. If you have questions, please call your primary care clinic.  If you do not have a primary care provider, please call [phone] and they will assist you.        Care EveryWhere ID     This is your Care EveryWhere ID. This could be used by other organizations to access your [place]  medical records  [id number]         Blood Pressure from Last 3 Encounters:   05/26/17 102/56   03/23/12 127/80    Weight from Last 3 Encounters:   05/26/17 63 kg (139 lb)   03/23/12 63.5 kg (140 lb)              We Performed the Following     MANUAL THER TECH,1+REGIONS,EA 15 MIN     THERAPEUTIC EXERCISES        Primary Care Provider Office Phone # Fax #    Tal Mcmahan [phone]       [facility] [address] [address] [facility] [address]        Equal Access to Services     [facility]: Hadii aad ku hadasho Soomaali, waaxda luqadaha, qaybta kaalmada adeegyada, arielle barnes . So [facility] [phone].    ATENCIÓN: Si habla español, tiene a matta disposición servicios gratuitos de asistencia lingüística. [facility] [phone].    We comply with applicable federal civil rights laws and [place] laws. We do not discriminate on the basis of race, color, national origin, age, disability sex, sexual orientation or gender identity.            Thank you!     Thank you for choosing [facility]  for your care. Our goal is always to provide you with excellent care. Hearing back from our patients is one way we can continue to improve our services. Please take a few minutes to complete the written survey that you may receive in the mail after your visit with us. Thank you!             Your Updated Medication List - Protect others around you: Learn how to safely use, store and throw away your medicines at www.disposemymeds.org.          This list is accurate as of: 6/27/17  2:30 PM.  Always use your most recent med list.                   Brand Name Dispense Instructions for use Diagnosis    LORazepam 1 MG tablet    ATIVAN    4 tablet    Take 0.5-1 tablets (0.5-1 mg) by mouth every 8 hours as needed for anxiety Take 30 minutes prior to departure.  Do not operate a vehicle after taking this medication    Generalized anxiety disorder       NO  ACTIVE MEDICATIONS       Urinary stream slowing       * tamsulosin 0.4 MG capsule    FLOMAX    90 capsule    Take 1 capsule (0.4 mg) by mouth daily    Lower urinary tract symptoms (LUTS)       * tamsulosin 0.4 MG capsule    FLOMAX    90 capsule    Take 1 capsule (0.4 mg) by mouth daily    Lower urinary tract symptoms (LUTS), Screening for prostate cancer       * Notice:  This list has 2 medication(s) that are the same as other medications prescribed for you. Read the directions carefully, and ask your doctor or other care provider to review them with you.

## 2017-06-27 NOTE — PROGRESS NOTES
Subjective:    HPI                    Objective:    System    Physical Exam    General     ROS    Assessment/Plan:      SUBJECTIVE  Subjective: A more noticable improvement now.  R hip and low back is really loosening up.  And chest and upper back on L is doing a lot better also   Current Pain level: 3/10   Changes in function:  Yes (See Goal flowsheet attached for changes in current functional level)     Adverse reaction to treatment or activity:  None    OBJECTIVE  Objective: Hypomobile R rib 3 and L rib 2.  Trigger points in L pec smaller than last visit.  Symmetrical SI mobility.   in R piriformis     ASSESSMENT  Sam continues to require intervention to meet STG and LTG's: PT  Patient is progressing as expected.  Response to therapy has shown an improvement in  pain level and ROM   Progress made towards STG/LTG?  Yes (See Goal flowsheet attached for updates on achievement of STG and LTG)    PLAN  Current treatment program is being advanced to more complex exercises.    PTA/ATC plan:  N/A    Please refer to the daily flowsheet for treatment today, total treatment time and time spent performing 1:1 timed codes.

## 2017-07-11 ENCOUNTER — THERAPY VISIT (OUTPATIENT)
Dept: PHYSICAL THERAPY | Facility: CLINIC | Age: 57
End: 2017-07-11
Payer: COMMERCIAL

## 2017-07-11 DIAGNOSIS — G89.29 CHRONIC BILATERAL LOW BACK PAIN WITHOUT SCIATICA: ICD-10-CM

## 2017-07-11 DIAGNOSIS — M54.50 CHRONIC BILATERAL LOW BACK PAIN WITHOUT SCIATICA: ICD-10-CM

## 2017-07-11 PROCEDURE — 97110 THERAPEUTIC EXERCISES: CPT | Mod: GP | Performed by: PHYSICAL THERAPIST

## 2017-07-11 PROCEDURE — 97140 MANUAL THERAPY 1/> REGIONS: CPT | Mod: GP | Performed by: PHYSICAL THERAPIST

## 2017-07-11 NOTE — MR AVS SNAPSHOT
After Visit Summary   7/11/2017    Sam Piper    MRN: [id number]           Patient Information     Date Of Birth          1960        Visit Information        Provider Department      7/11/2017 1:30 PM Rajeev Salamanca PT [facility] [place] [facility]        Today's Diagnoses     Chronic bilateral low back pain without sciatica           Follow-ups after your visit        Who to contact     If you have questions or need follow up information about today's clinic visit or your schedule please contact [facility] directly at [phone].  Normal or non-critical lab and imaging results will be communicated to you by Snagstahart, letter or phone within 4 business days after the clinic has received the results. If you do not hear from us within 7 days, please contact the clinic through Snagstahart or phone. If you have a critical or abnormal lab result, we will notify you by phone as soon as possible.  Submit refill requests through WinDensity or call your pharmacy and they will forward the refill request to us. Please allow 3 business days for your refill to be completed.          Additional Information About Your Visit        MyChart Information     WinDensity gives you secure access to your electronic health record. If you see a primary care provider, you can also send messages to your care team and make appointments. If you have questions, please call your primary care clinic.  If you do not have a primary care provider, please call [phone] and they will assist you.        Care EveryWhere ID     This is your Care EveryWhere ID. This could be used by other organizations to access your [place] medical records  [id number]         Blood Pressure from Last 3 Encounters:   05/26/17 102/56   03/23/12 127/80    Weight from Last 3 Encounters:   05/26/17 63 kg (139 lb)   03/23/12 63.5 kg (140 lb)              We Performed the Following     MANUAL THER  TECH,1+[facility], 15 MIN     THERAPEUTIC EXERCISES        Primary Care Provider Office Phone # Fax #    Tal Mcmahan [phone]       [facility] [address] [facility] [address]        Equal Access to Services     [facility] : Hadii aad ku hadasho Soomaali, waaxda luqadaha, qaybta kaalmada adeegyada, waxay idiin hayulisesn adeeg khliz labinarhea gonzáles. So [facility] [phone].    ATENCIÓN: Si habla español, tiene a matta disposición servicios gratuitos de asistencia lingüística. Llame al [phone].    We comply with applicable federal civil rights laws and [place] laws. We do not discriminate on the basis of race, color, national origin, age, disability sex, sexual orientation or gender identity.            Thank you!     Thank you for choosing [facility]   for your care. Our goal is always to provide you with excellent care. Hearing back from our patients is one way we can continue to improve our services. Please take a few minutes to complete the written survey that you may receive in the mail after your visit with us. Thank you!             Your Updated Medication List - Protect others around you: Learn how to safely use, store and throw away your medicines at www.disposemymeds.org.          This list is accurate as of: 7/11/17  4:10 PM.  Always use your most recent med list.                   Brand Name Dispense Instructions for use Diagnosis    LORazepam 1 MG tablet    ATIVAN    4 tablet    Take 0.5-1 tablets (0.5-1 mg) by mouth every 8 hours as needed for anxiety Take 30 minutes prior to departure.  Do not operate a vehicle after taking this medication    Generalized anxiety disorder       NO ACTIVE MEDICATIONS       Urinary stream slowing       * tamsulosin 0.4 MG capsule    FLOMAX    90 capsule    Take 1 capsule (0.4 mg) by mouth daily    Lower urinary tract symptoms (LUTS)       * tamsulosin 0.4 MG capsule    FLOMAX    90 capsule    Take 1 capsule  (0.4 mg) by mouth daily    Lower urinary tract symptoms (LUTS), Screening for prostate cancer       * Notice:  This list has 2 medication(s) that are the same as other medications prescribed for you. Read the directions carefully, and ask your doctor or other care provider to review them with you.

## 2017-07-13 DIAGNOSIS — R39.9 LOWER URINARY TRACT SYMPTOMS (LUTS): ICD-10-CM

## 2017-07-13 NOTE — TELEPHONE ENCOUNTER
tamsulosin (FLOMAX) 0.4 MG capsule      Last Written Prescription Date: 5/26/2017  Last Fill Quantity: 90, # refills: 3    Last Office Visit with [facility], [facility] or [facility] prescribing provider:  5/26/2017   Future Office Visit:      BP Readings from Last 3 Encounters:   05/26/17 102/56   03/23/12 127/80

## 2017-07-14 RX ORDER — TAMSULOSIN HYDROCHLORIDE 0.4 MG/1
0.4 CAPSULE ORAL DAILY
Qty: 90 CAPSULE | Refills: 2 | Status: SHIPPED | OUTPATIENT
Start: 2017-07-14

## 2017-07-14 NOTE — TELEPHONE ENCOUNTER
Prescription approved per [facility] Refill Protocol.  Amelia Steward RN

## 2017-07-14 NOTE — TELEPHONE ENCOUNTER
Script faxed to [facility]    Jen Chow, [facility]

## 2017-08-13 PROCEDURE — 82274 ASSAY TEST FOR BLOOD FECAL: CPT | Performed by: FAMILY MEDICINE

## 2017-08-17 DIAGNOSIS — Z12.11 SPECIAL SCREENING FOR MALIGNANT NEOPLASMS, COLON: ICD-10-CM

## 2017-08-17 LAB — HEMOCCULT STL QL IA: NEGATIVE

## 2017-08-17 NOTE — PROGRESS NOTES
Mr. Piper,    Your stool test for blood was normal.  This is a screening test done to detect polyps or cancer that often release small amounts of blood.  This normal result doesn't guarantee that you do not have any polyps or cancer in your colon but it is much less likely.  If you choose not have a colonoscopy screening test done, you need to have this stool test done once yearly.       Please contact the clinic if you have additional questions.  Thank you.    Sincerely,    Fernando Hutchison MD

## 2018-03-14 NOTE — PROGRESS NOTES
Do you have an Advanced Directive? NO    Would you like information on Advanced Directives? NO      Was information provided?  NO Subjective:    HPI                    Objective:    System    Physical Exam    General     ROS    Assessment/Plan:      DISCHARGE REPORT    Progress reporting period is from 6/8/17 to 7/11/17.       SUBJECTIVE  Subjective: Sleeping through the night most nights and that is really exciting because he hasn't done that in a long time.  Still gets pain with standing for long periods at work but not he knows when it is happening and knows how to manage it before it gets bad    Current Pain level: 2/10.     Initial Pain level: 6/10.   Changes in function:  Yes (See Goal flowsheet attached for changes in current functional level)  Adverse reaction to treatment or activity: None    OBJECTIVE  Objective: Improved lumbar extension- only mild loss with prone extension.  Good hip ER mobility and pt reports frog stretch and fire hydrant have done the most for him with that.  Decreased R piriformis tenderness but not resolved     ASSESSMENT/PLAN  Updated problem list and treatment plan: Diagnosis 1:  LBP    STG/LTGs have been met or progress has been made towards goals:  Yes (See Goal flow sheet completed today.)  Assessment of Progress: The patient's condition is improving.  The patient has met all of their long term goals.  Self Management Plans:  Patient is independent in a home treatment program.  Sam continues to require the following intervention to meet STG and LTG's:  PT intervention is no longer required to meet STG/LTG.    Recommendations:  This patient is ready to be discharged from therapy and continue their home treatment program.    Please refer to the daily flowsheet for treatment today, total treatment time and time spent performing 1:1 timed codes.